# Patient Record
Sex: MALE | Race: BLACK OR AFRICAN AMERICAN | NOT HISPANIC OR LATINO | Employment: OTHER | URBAN - METROPOLITAN AREA
[De-identification: names, ages, dates, MRNs, and addresses within clinical notes are randomized per-mention and may not be internally consistent; named-entity substitution may affect disease eponyms.]

---

## 2024-06-21 ENCOUNTER — HOSPITAL ENCOUNTER (EMERGENCY)
Facility: HOSPITAL | Age: 39
Discharge: HOME | End: 2024-06-21
Attending: EMERGENCY MEDICINE

## 2024-06-21 VITALS
HEIGHT: 71 IN | BODY MASS INDEX: 24.78 KG/M2 | SYSTOLIC BLOOD PRESSURE: 132 MMHG | WEIGHT: 177 LBS | RESPIRATION RATE: 18 BRPM | OXYGEN SATURATION: 99 % | TEMPERATURE: 98 F | HEART RATE: 56 BPM | DIASTOLIC BLOOD PRESSURE: 79 MMHG

## 2024-06-21 DIAGNOSIS — D17.23 LIPOMA OF RIGHT LOWER EXTREMITY: ICD-10-CM

## 2024-06-21 DIAGNOSIS — L72.3 SEBACEOUS CYST: Primary | ICD-10-CM

## 2024-06-21 PROCEDURE — 99281 EMR DPT VST MAYX REQ PHY/QHP: CPT

## 2024-06-21 NOTE — DISCHARGE INSTRUCTIONS
Follow up with the [facility] as discussed.  Present to nearest ED immediately for redness to affected areas, tenderness, or wound development.

## 2024-06-21 NOTE — ED PROVIDER NOTES
"Encounter Date: 6/21/2024       History     Chief Complaint   Patient presents with    Cyst     Pt w/ cyst to right hip x3 years, sent to ER for referral. No redness, pain, S/S infection noted. No PMH. VSS, NAD noted.     Pt is a 38 y.o. male who presents to the [facility] ED for evaluation of a cyst to his Rt hip. Reports area has been present for the last 9-10 years. Denies pain to site, redness, or recent enlargement. Pt reports coming to the ED to "get a referral to remove it. I just don't want it there any more." Denies injury to site, chest pain, SOB, weakness, dizziness, fever, abdominal pain, or loss of bowel or bladder control. Reports having previous "cysts" to scalp removed in the past and admits to having one on his scalp that he also wants removed.      Review of patient's allergies indicates:   Allergen Reactions    Pcn [penicillins]      History reviewed. No pertinent past medical history.  No past surgical history on file.  No family history on file.     Review of Systems   Constitutional:  Negative for chills, diaphoresis, fatigue and fever.   HENT:  Negative for facial swelling, postnasal drip, rhinorrhea, sinus pressure, sinus pain, sore throat and trouble swallowing.    Respiratory:  Negative for cough, chest tightness, shortness of breath and wheezing.    Cardiovascular:  Negative for chest pain, palpitations and leg swelling.   Gastrointestinal:  Negative for abdominal pain, diarrhea, nausea and vomiting.   Genitourinary:  Negative for dysuria, flank pain, hematuria and urgency.   Musculoskeletal:  Negative for arthralgias, back pain and myalgias.   Skin:  Negative for color change and rash.        "Cyst" to Rt hip   Neurological:  Negative for dizziness, syncope, weakness and headaches.   Hematological:  Does not bruise/bleed easily.   All other systems reviewed and are negative.      Physical Exam     Initial Vitals [06/21/24 1522]   BP Pulse Resp Temp SpO2   132/79 (!) 56 18 97.9 °F (36.6 °C) 99 % "      MAP       --         Physical Exam    Nursing note and vitals reviewed.  Constitutional: Vital signs are normal. He appears well-developed and well-nourished.   HENT:   Head: Normocephalic.       Nose: Nose normal.   Mouth/Throat: Oropharynx is clear and moist.   Eyes: Conjunctivae and EOM are normal. Pupils are equal, round, and reactive to light.   Neck: Neck supple.   Normal range of motion.  Cardiovascular:  Normal rate, regular rhythm, normal heart sounds and intact distal pulses.           Pulmonary/Chest: Effort normal and breath sounds normal. No respiratory distress. He has no wheezes. He has no rhonchi. He has no rales. He exhibits no tenderness.   Abdominal: Abdomen is soft and flat. Bowel sounds are normal. There is no abdominal tenderness. There is no rebound, no guarding, no tenderness at McBurney's point and negative Daugherty's sign.   Musculoskeletal:         General: Normal range of motion.      Cervical back: Normal range of motion and neck supple.        Legs:      Neurological: He is alert and oriented to person, place, and time. He has normal strength.   Skin: Skin is warm and dry. Capillary refill takes less than 2 seconds.   Psychiatric: He has a normal mood and affect. His behavior is normal. Judgment and thought content normal.         ED Course   Procedures  Labs Reviewed - No data to display       Imaging Results    None          Medications - No data to display  Medical Decision Making  Differential:  Sebaceous cyst  Lipoma                 ED Course as of 06/21/24 1537   Fri Jun 21, 2024   1534 Pt will be referred to the [facility] as requested for excision of areas of concern. Stressed need for pt to return to the [facility] ED if affected areas develop redness, tenderness, or develop open wounds. Understanding and agreement voiced.   Given strict ED return precautions. I have spoken with the patient and/or caregivers. I have explained the patient's condition, diagnoses and  treatment plan based on the information available to me at this time. I have answered the patient's and/or caregiver's questions and addressed any concerns. The patient and/or caregivers have as good an understanding of the patient's diagnosis, condition and treatment plan as can be expected at this point. The vital signs have been stable. The patient's condition is stable and appropriate for discharge from the emergency department.      The patient will pursue further outpatient evaluation with the primary care physician or other designated or consulting physician as outlined in the discharge instructions. The patient and/or caregivers are agreeable to this plan of care and follow-up instructions have been explained in detail. The patient and/or caregivers have received these instructions in written format and have expressed an understanding of the discharge instructions. The patient and/or caregivers are aware that any significant change in condition or worsening of symptoms should prompt an immediate return to this or the closest emergency department or a call to 911.   [JA]      ED Course User Index  [JA] Wesley Guthrie Jr., FNP                           Clinical Impression:  Final diagnoses:  [L72.3] Sebaceous cyst (Primary)  [D17.23] Lipoma of right lower extremity          ED Disposition Condition    Discharge Stable          ED Prescriptions    None       Follow-up Information       Follow up With Specialties Details Why Contact Info    [facility] - Emergency Dept Emergency Medicine In 3 days As needed, If symptoms worsen [address] [facility] [address] [phone]             Wesley Guthrie Jr., FNP  06/21/24 9467

## 2024-07-23 ENCOUNTER — OFFICE VISIT (OUTPATIENT)
Dept: FAMILY MEDICINE | Facility: CLINIC | Age: 39
End: 2024-07-23
Payer: MEDICAID

## 2024-07-23 VITALS
HEART RATE: 82 BPM | WEIGHT: 187 LBS | SYSTOLIC BLOOD PRESSURE: 121 MMHG | TEMPERATURE: 99 F | HEIGHT: 71 IN | OXYGEN SATURATION: 99 % | BODY MASS INDEX: 26.18 KG/M2 | RESPIRATION RATE: 18 BRPM | DIASTOLIC BLOOD PRESSURE: 79 MMHG

## 2024-07-23 DIAGNOSIS — L72.3 SEBACEOUS CYST: ICD-10-CM

## 2024-07-23 DIAGNOSIS — L72.11 PILAR CYST OF SCALP: Primary | ICD-10-CM

## 2024-07-23 DIAGNOSIS — D17.23 LIPOMA OF RIGHT LOWER EXTREMITY: ICD-10-CM

## 2024-07-23 PROCEDURE — 99214 OFFICE O/P EST MOD 30 MIN: CPT | Mod: PBBFAC

## 2024-07-23 PROCEDURE — 11422 EXC H-F-NK-SP B9+MARG 1.1-2: CPT | Mod: PBBFAC

## 2024-07-23 RX ORDER — LIDOCAINE HYDROCHLORIDE 10 MG/ML
5 INJECTION, SOLUTION EPIDURAL; INFILTRATION; INTRACAUDAL; PERINEURAL
Status: COMPLETED | OUTPATIENT
Start: 2024-07-23 | End: 2024-07-23

## 2024-07-23 RX ADMIN — LIDOCAINE HYDROCHLORIDE 50 MG: 10 INJECTION, SOLUTION EPIDURAL; INFILTRATION; INTRACAUDAL; PERINEURAL at 01:07

## 2024-07-23 NOTE — PROGRESS NOTES
"[facility]    CHIEF COMPLAINT:  Chief Complaint   Patient presents with    cyst on leg           HISTORY OF  PRESENT ILLNESS:  Tonio Tello is a 38 y.o. male who presents to  [facility] for a cyst on the scalp and cyst on the R hip.     Cyst on scalp-present for 2 years. Makes it hard for him to shave his head. Denies pain, bleeding, drainage. Has had cysts of the scalp removed twice in the past, over 10 years ago. The cyst today is new.     Cyst on R hip- present for 15 years. Has grown bigger overtime. Denies pain, bleeding, drainage.     Patient would like to discuss treatment options today.      REVIEW OF SYSTEMS:  Per HPI.    PHYSICAL EXAMINATION:  Blood pressure 121/79, pulse 82, temperature 98.6 °F (37 °C), resp. rate 18, height 5' 10.87" (1.8 m), weight 84.8 kg (187 lb), SpO2 99%.    General:  VSS. No acute distress.   Skin: ~1.5 cm flesh-colored, nodular lesion on the R scalp parietal area. ~10 cm flesh-colored, nodular mass on the R hip. (See photos below)      R scalp cyst        R hip lipoma     ASSESSMENT/PLAN:  Pilar cyst of scalp  - Excision of R scalp cyst performed today, see procedure note below   - Post-care instructions given     Lipoma of R hip  - Will refer to general surgery as mass is too large to remove in [facility]     Return to  [facility] in 2 weeks for suture removal     PROCEDURE NOTE:  Procedure: Excision of cyst on R scalp   Performed by: Ginger Lynne MD  Supervised by: Carla Rsos MD   Consent: signed consent obtained after discussion of risks, benefits, and alternative treatments. Time out completed  Description: Skin prepped with alcohol pads. 1% lidocaine used for anesthesia. Skin cleaned with Chloroprep, draped in a sterile fashion. Incision made with 4mm punch biopsy. Some sebaceous material expressed manually. Cyst wall  from dermis using blunt dissection, extracted through wound opening. Hemostasis " achieved with pressure. Wound closure with one interrupted suture of Ethilon 4-0. Estimated Blood Loss: <5cc   The patient tolerated the procedure well with no complications.      Ginger Lynne MD    [facility] Family Medicine Resident, [facility]

## 2024-07-30 ENCOUNTER — OFFICE VISIT (OUTPATIENT)
Dept: SURGERY | Facility: CLINIC | Age: 39
End: 2024-07-30
Payer: MEDICAID

## 2024-07-30 VITALS
SYSTOLIC BLOOD PRESSURE: 126 MMHG | DIASTOLIC BLOOD PRESSURE: 79 MMHG | WEIGHT: 185 LBS | RESPIRATION RATE: 18 BRPM | HEART RATE: 56 BPM | OXYGEN SATURATION: 100 % | TEMPERATURE: 98 F | HEIGHT: 70 IN | BODY MASS INDEX: 26.48 KG/M2

## 2024-07-30 DIAGNOSIS — D17.23 LIPOMA OF RIGHT LOWER EXTREMITY: ICD-10-CM

## 2024-07-30 PROCEDURE — 99214 OFFICE O/P EST MOD 30 MIN: CPT | Mod: PBBFAC

## 2024-07-30 NOTE — PROGRESS NOTES
[facility] GENERAL SURGERY   Clinic Note       CC: R hip mass      HPI: Tonio Tello is a 38 y.o. male with no pertinent PMHx presents to the clinic for evaluation of R hip mass that has been present for approximately 10 years and has progressively increased in size over the past 5 years. Patient had a R scalp pilar cyst excised on 7/23/24 in [facility]. Denies any redness, drainage, or pain to the area. Denies any fever, chills, abdominal pain, n/v, or changes in BM. Able to exercise without chest pain. Surgical history includes hemorrhoidectomy approximately 4 years ago.     PMH:  No past medical history on file.      PSH:  No past surgical history on file.      Medications:  No current outpatient medications on file prior to visit.     No current facility-administered medications on file prior to visit.        Allergies:  Review of patient's allergies indicates:   Allergen Reactions    Pcn [penicillins]          Social Hx:  Social History     Tobacco Use   Smoking Status Every Day    Current packs/day: 1.00    Average packs/day: 1 pack/day for 5.0 years (5.0 ttl pk-yrs)    Types: Cigarettes   Smokeless Tobacco Not on file      Social History     Substance and Sexual Activity   Alcohol Use Not on file         Relevant Family Hx:  No family history on file.       Physical Exam:   There were no vitals filed for this visit.   Gen: NAD, AAOx3   Eye: EOMI   Pulm: NWOB on RA  Abd: soft, non-tender, non-distended  Ext:  Move all 4 extremities.   Skin: ~11 cm flesh colored, soft, nodular, nontender immobile mass on the R hip (photo below)                A/P: Tonio Tello is a 38 y.o. male with no pertinent PMHx presents to the clinic for evalutation of R hip mass. Mass has been present for approximately 10 years and steadily grown in size over the past 5 years. Denies any changes in the skin overlying the area, pain, or drainage. Mass is approximately 11 cm on the R hip and is soft, immobile, and  nontender. PT is able to exercise without chest pain.       - Obtain CT scan   - Follow up in clinic in 3 weeks with CT scan results. Will evaluate for possible excision at that time.     Iva Smith  [facility] MS3

## 2024-07-30 NOTE — PROGRESS NOTES
[facility] GENERAL SURGERY   Clinic Note       CC: R hip mass      HPI: Tonio Tello is a 38 y.o. male with no pertinent PMHx presents to the clinic for evaluation of right hip mass that has been present for approximately 10 years and has progressively increased in size over the past 5 years. He reports some pain and discomfort with lying on his right side. Denies any redness, drainage, or pain to the area. Patient had a R scalp pilar cyst excised on 7/23/24 in [facility]. Denies any fever, chills, abdominal pain, n/v, or changes in BM. Able to achieve >4 METs without chest pain. Surgical history includes hemorrhoidectomy approximately 4 years ago.     PMH:  History reviewed. No pertinent past medical history.      PSH:  History reviewed. No pertinent surgical history.      Medications:  No current outpatient medications on file prior to visit.     No current facility-administered medications on file prior to visit.        Allergies:  Review of patient's allergies indicates:   Allergen Reactions    Pcn [penicillins]          Social Hx:  Social History     Tobacco Use   Smoking Status Every Day    Current packs/day: 1.00    Average packs/day: 1 pack/day for 5.0 years (5.0 ttl pk-yrs)    Types: Cigarettes   Smokeless Tobacco Never      Social History     Substance and Sexual Activity   Alcohol Use None         Relevant Family Hx:  No family history on file.       Physical Exam:   Vitals:    07/30/24 1035   BP: 126/79   Pulse: (!) 56   Resp: 18   Temp: 97.9 °F (36.6 °C)      Gen: NAD, AAOx3   Eye: EOMI   Pulm: NWOB on RA  Abd: soft, non-tender, non-distended  Ext:  Move all 4 extremities.   Skin: ~11 cm flesh colored, soft, non-tender, immobile mass on the R hip (photo below)                A/P: Tonio Tello is a 38 y.o. male with no pertinent PMHx presents to the clinic for evalutation of R hip mass. Mass has been present for approximately 10 years and steadily grown in size over the past 5  years. Denies any changes in the skin overlying the area, pain, or drainage. Mass is approximately 11 cm on the R hip and is soft, immobile, and nontender. PT is able to achieve >4 METs without chest pain or SOB.       - CT w/ contrast of Right Hip  - Follow up in clinic in 3 weeks with CT scan results.   - Will evaluate for possible excision at that time.     Iva Smith  [facility] LAW MS3    Patient seen and examined alongside medical student. Note reviewed and edited as appropriate. Agree with plan as written above.     Gilberto Pepper MD  [facility] General Surgery PGY-1

## 2024-08-02 ENCOUNTER — HOSPITAL ENCOUNTER (OUTPATIENT)
Dept: RADIOLOGY | Facility: HOSPITAL | Age: 39
Discharge: HOME | End: 2024-08-02
Payer: MEDICAID

## 2024-08-02 DIAGNOSIS — D17.23 LIPOMA OF RIGHT LOWER EXTREMITY: ICD-10-CM

## 2024-08-02 PROCEDURE — 73701 CT LOWER EXTREMITY W/DYE: CPT | Mod: TC,RT

## 2024-08-02 PROCEDURE — 25500020 PHARM REV CODE 255

## 2024-08-02 RX ADMIN — IOHEXOL 100 ML: 350 INJECTION, SOLUTION INTRAVENOUS at 01:08

## 2024-08-06 ENCOUNTER — OFFICE VISIT (OUTPATIENT)
Dept: FAMILY MEDICINE | Facility: CLINIC | Age: 39
End: 2024-08-06
Payer: MEDICAID

## 2024-08-06 VITALS
WEIGHT: 189 LBS | BODY MASS INDEX: 27.06 KG/M2 | TEMPERATURE: 99 F | RESPIRATION RATE: 18 BRPM | SYSTOLIC BLOOD PRESSURE: 128 MMHG | OXYGEN SATURATION: 99 % | HEIGHT: 70 IN | DIASTOLIC BLOOD PRESSURE: 79 MMHG

## 2024-08-06 DIAGNOSIS — Z48.02 VISIT FOR SUTURE REMOVAL: Primary | ICD-10-CM

## 2024-08-06 PROCEDURE — 99213 OFFICE O/P EST LOW 20 MIN: CPT | Mod: PBBFAC

## 2024-08-15 ENCOUNTER — ANESTHESIA EVENT (OUTPATIENT)
Dept: SURGERY | Facility: HOSPITAL | Age: 39
End: 2024-08-15
Payer: MEDICAID

## 2024-08-15 ENCOUNTER — OFFICE VISIT (OUTPATIENT)
Dept: SURGERY | Facility: CLINIC | Age: 39
End: 2024-08-15
Payer: MEDICAID

## 2024-08-15 VITALS
WEIGHT: 187.19 LBS | OXYGEN SATURATION: 100 % | SYSTOLIC BLOOD PRESSURE: 123 MMHG | BODY MASS INDEX: 26.8 KG/M2 | DIASTOLIC BLOOD PRESSURE: 84 MMHG | HEIGHT: 70 IN | TEMPERATURE: 98 F | HEART RATE: 55 BPM

## 2024-08-15 DIAGNOSIS — D17.23 LIPOMA OF RIGHT LOWER EXTREMITY: Primary | ICD-10-CM

## 2024-08-15 PROCEDURE — 99214 OFFICE O/P EST MOD 30 MIN: CPT | Mod: PBBFAC

## 2024-08-15 RX ORDER — HEPARIN SODIUM 5000 [USP'U]/ML
5000 INJECTION, SOLUTION INTRAVENOUS; SUBCUTANEOUS EVERY 8 HOURS
OUTPATIENT
Start: 2024-08-15

## 2024-08-15 RX ORDER — SODIUM CHLORIDE 9 MG/ML
INJECTION, SOLUTION INTRAVENOUS CONTINUOUS
OUTPATIENT
Start: 2024-08-15

## 2024-08-15 RX ORDER — CLINDAMYCIN PHOSPHATE 900 MG/50ML
900 INJECTION, SOLUTION INTRAVENOUS
OUTPATIENT
Start: 2024-08-15

## 2024-08-15 NOTE — PROGRESS NOTES
Seen by Dr. Pepper.  Surgery scheduled 8/21/24.  Surgery consent signed and witnessed.  One Day Stay instruction sheet explained and given to patient.  Post op appt scheduled.

## 2024-08-15 NOTE — PROGRESS NOTES
[facility] GENERAL SURGERY   Clinic Note       CC: Right Hip Lipoma       HPI: Tonio Tello is a 38 y.o. male with no pertinent PMHx presents to the clinic for evaluation of right hip mass that has been present for approximately 10 years and has progressively increased in size over the past 5 years. Patient had CT scan on 08/02 which showed a 9.2 x 3.8 x 10.5 cm encapsulated appearing area of fat attenuation along the lateral aspect of the right hip. He reports some pain and discomfort with lying on his right side. Denies any redness, drainage, or pain to the area. Denies any fever, chills, abdominal pain, n/v, or changes in BM. Able to achieve >4 METs without chest pain. Surgical history includes hemorrhoidectomy approximately 4 years ago.         PMH:  History reviewed. No pertinent past medical history.      PSH:  Past Surgical History:   Procedure Laterality Date    HEMORRHOID SURGERY      x 2 surgeries 3 years ago         Medications:  No current outpatient medications on file prior to visit.     No current facility-administered medications on file prior to visit.        Allergies:  Review of patient's allergies indicates:   Allergen Reactions    Pcn [penicillins]          Social Hx:  Social History     Tobacco Use   Smoking Status Every Day    Current packs/day: 1.00    Average packs/day: 1 pack/day for 5.0 years (5.0 ttl pk-yrs)    Types: Cigarettes   Smokeless Tobacco Never   Tobacco Comments    Smokes 1 pack per week      Social History     Substance and Sexual Activity   Alcohol Use Yes    Comment: 6 times yearly--1 beer         Relevant Family Hx:  Family History   Problem Relation Name Age of Onset    Cancer Mother      Heart disease Father            Physical Exam:   Vitals:    08/15/24 1142   BP: 123/84   Pulse: (!) 55   Temp: 98.1 °F (36.7 °C)      Gen: NAD, AAOx3   Eye: EOMI   CV: RR  Pulm: NWOB on RA  Abd: soft, non-tender, non-distended  Ext:  Move all 4 extremities. Large soft, mobile subcutaneous  mass on right hip        Interval Imaging:    CT Right Hip 08/02:    FINDINGS:  There appears to be an encapsulated appearing area of fat attenuation along the lateral aspect of the right hip overlying the right gluteus medius muscle.  This measures approximately 9.2 x 3.8 x 10.5 cm (AP by transverse by cc).  No definite suspicious enhancing soft tissue nodules are appreciated.  However, there appears to be evidence of slightly thickened appearing soft tissue septations along the inferior margin of this lipomatous lesion and subtle thickening of the posterior-inferior capsule.  Although this finding could be related to a lipoma, low-grade liposarcoma would be difficult to entirely exclude given slightly thickened septations and posteroinferior capsule.  No suspicious enhancement within the adjacent gluteus medius muscle is seen.  Subcentimeter in short axis dimension right pelvic lymph nodes are noted.  The included pelvic bowel loops demonstrate no significant dilatation to suggest high-grade obstruction.  The appendix is visualized and appears normal in caliber.  Small fat containing right inguinal hernia is noted.  No evidence of acute displaced fracture or active dislocation is visualized.  The superior right hip joint space is grossly maintained.  The visualized sacroiliac joints appear patent and aligned.     Impression:  There is an encapsulated appearing area of fat attenuation along the lateral aspect of the right hip.No definite suspicious enhancing soft tissue nodules are appreciated. However, there appears to be evidence of slightly thickened appearing soft tissue septations along the inferior margin of this lipomatous lesion and subtle thickening of the posterior-inferior capsule.  Although this finding could be related to a lipoma, low-grade liposarcoma would be difficult to entirely exclude given slightly thickened septations and posteroinferior capsule.  Please correlate clinically in regards to  further evaluation and follow-up.             A/P: Tonio Tello is a 38 y.o. male with a  9.2 x 3.8 x 10.5 cm lipomatous mass of the right hip presenting today to discuss surgical intervention.      - OR for lipomatous mass removal on 8/21  - r/b/a discussed with patient, who agrees and would like to proceed with surgical intervention.  - consents obtained  - NPO midnight day of surgery  - Follow up 2 weeks post-op    Gilberto Pepper MD  [facility] General Surgery PGY-1

## 2024-08-15 NOTE — H&P (VIEW-ONLY)
[facility] GENERAL SURGERY   Clinic Note       CC: Right Hip Lipoma       HPI: Tonio Tello is a 38 y.o. male with no pertinent PMHx presents to the clinic for evaluation of right hip mass that has been present for approximately 10 years and has progressively increased in size over the past 5 years. Patient had CT scan on 08/02 which showed a 9.2 x 3.8 x 10.5 cm encapsulated appearing area of fat attenuation along the lateral aspect of the right hip. He reports some pain and discomfort with lying on his right side. Denies any redness, drainage, or pain to the area. Denies any fever, chills, abdominal pain, n/v, or changes in BM. Able to achieve >4 METs without chest pain. Surgical history includes hemorrhoidectomy approximately 4 years ago.         PMH:  History reviewed. No pertinent past medical history.      PSH:  Past Surgical History:   Procedure Laterality Date    HEMORRHOID SURGERY      x 2 surgeries 3 years ago         Medications:  No current outpatient medications on file prior to visit.     No current facility-administered medications on file prior to visit.        Allergies:  Review of patient's allergies indicates:   Allergen Reactions    Pcn [penicillins]          Social Hx:  Social History     Tobacco Use   Smoking Status Every Day    Current packs/day: 1.00    Average packs/day: 1 pack/day for 5.0 years (5.0 ttl pk-yrs)    Types: Cigarettes   Smokeless Tobacco Never   Tobacco Comments    Smokes 1 pack per week      Social History     Substance and Sexual Activity   Alcohol Use Yes    Comment: 6 times yearly--1 beer         Relevant Family Hx:  Family History   Problem Relation Name Age of Onset    Cancer Mother      Heart disease Father            Physical Exam:   Vitals:    08/15/24 1142   BP: 123/84   Pulse: (!) 55   Temp: 98.1 °F (36.7 °C)      Gen: NAD, AAOx3   Eye: EOMI   CV: RR  Pulm: NWOB on RA  Abd: soft, non-tender, non-distended  Ext:  Move all 4 extremities. Large soft, mobile subcutaneous  mass on right hip        Interval Imaging:    CT Right Hip 08/02:    FINDINGS:  There appears to be an encapsulated appearing area of fat attenuation along the lateral aspect of the right hip overlying the right gluteus medius muscle.  This measures approximately 9.2 x 3.8 x 10.5 cm (AP by transverse by cc).  No definite suspicious enhancing soft tissue nodules are appreciated.  However, there appears to be evidence of slightly thickened appearing soft tissue septations along the inferior margin of this lipomatous lesion and subtle thickening of the posterior-inferior capsule.  Although this finding could be related to a lipoma, low-grade liposarcoma would be difficult to entirely exclude given slightly thickened septations and posteroinferior capsule.  No suspicious enhancement within the adjacent gluteus medius muscle is seen.  Subcentimeter in short axis dimension right pelvic lymph nodes are noted.  The included pelvic bowel loops demonstrate no significant dilatation to suggest high-grade obstruction.  The appendix is visualized and appears normal in caliber.  Small fat containing right inguinal hernia is noted.  No evidence of acute displaced fracture or active dislocation is visualized.  The superior right hip joint space is grossly maintained.  The visualized sacroiliac joints appear patent and aligned.     Impression:  There is an encapsulated appearing area of fat attenuation along the lateral aspect of the right hip.No definite suspicious enhancing soft tissue nodules are appreciated. However, there appears to be evidence of slightly thickened appearing soft tissue septations along the inferior margin of this lipomatous lesion and subtle thickening of the posterior-inferior capsule.  Although this finding could be related to a lipoma, low-grade liposarcoma would be difficult to entirely exclude given slightly thickened septations and posteroinferior capsule.  Please correlate clinically in regards to  further evaluation and follow-up.             A/P: Tonio Tello is a 38 y.o. male with a  9.2 x 3.8 x 10.5 cm lipomatous mass of the right hip presenting today to discuss surgical intervention.      - OR for lipomatous mass removal on 8/21  - r/b/a discussed with patient, who agrees and would like to proceed with surgical intervention.  - consents obtained  - NPO midnight day of surgery  - Follow up 2 weeks post-op    Gilberto ePpper MD  [facility] General Surgery PGY-1

## 2024-08-15 NOTE — ANESTHESIA PREPROCEDURE EVALUATION
Tonio Tello is a 38 y.o. male PRESENTING FOR EXCISION, LIPOMA (Right) with a history of   -LIPOMA RLE  -SMOKER      BETA-BLOCKER: NONE    New Orders for Anesthesia: NONE    Active Ambulatory Problems     Diagnosis Date Noted    No Active Ambulatory Problems     Resolved Ambulatory Problems     Diagnosis Date Noted    No Resolved Ambulatory Problems     No Additional Past Medical History         Pre-op Assessment    I have reviewed the NPO Status.      Review of Systems  Anesthesia Hx:  No problems with previous Anesthesia                Social:  Smoker       Cardiovascular:  Cardiovascular Normal                                            Pulmonary:  Pulmonary Normal                       Renal/:  Renal/ Normal                 Hepatic/GI:  Hepatic/GI Normal                 Neurological:  Neurology Normal                                      Endocrine:  Endocrine Normal              Vitals:    08/21/24 0624 08/21/24 0629 08/21/24 0655 08/21/24 0808   BP: 128/82  128/82 132/78   BP Location: Right arm      Patient Position: Sitting      Pulse: (!) 41 (!) 44  (!) 48   Resp: 18   20   Temp: 36.3 °C (97.4 °F)   36 °C (96.8 °F)   TempSrc: Oral   Temporal   SpO2: 100%   100%   Weight:             Physical Exam  General: Alert, Cooperative and Well nourished    Airway:  Mallampati: II   Mouth Opening: Normal  TM Distance: Normal  Tongue: Normal  Neck ROM: Normal ROM    Dental:  Partial Dentures    Chest/Lungs:  Clear to auscultation, Normal Respiratory Rate    Heart:  Rate: Normal  Rhythm: Regular Rhythm  Sounds: Normal        Anesthesia Plan  Type of Anesthesia, risks & benefits discussed:    Anesthesia Type: Gen Supraglottic Airway  Intra-op Monitoring Plan: Standard ASA Monitors  Post Op Pain Control Plan: IV/PO Opioids PRN  Induction:  IV  Airway Plan: Direct  Informed Consent: Informed consent signed with the Patient and all parties understand the risks and agree with anesthesia plan.  All questions answered.    ASA Score: 2  Day of Surgery Review of History & Physical: H&P Update referred to the surgeon/provider.    Ready For Surgery From Anesthesia Perspective.     .

## 2024-08-16 NOTE — PROGRESS NOTES
I have reviewed the notes, assessments, and/or procedures performed by Shantelle, I concur with her/his documentation of Tonio Tello.  Date of Service: 8/15/2024    [facility]

## 2024-08-20 ENCOUNTER — PATIENT MESSAGE (OUTPATIENT)
Dept: SURGERY | Facility: HOSPITAL | Age: 39
End: 2024-08-20
Payer: MEDICAID

## 2024-08-21 ENCOUNTER — ANESTHESIA (OUTPATIENT)
Dept: SURGERY | Facility: HOSPITAL | Age: 39
End: 2024-08-21
Payer: MEDICAID

## 2024-08-21 ENCOUNTER — HOSPITAL ENCOUNTER (OUTPATIENT)
Facility: HOSPITAL | Age: 39
Discharge: HOME | End: 2024-08-21
Attending: STUDENT IN AN ORGANIZED HEALTH CARE EDUCATION/TRAINING PROGRAM | Admitting: STUDENT IN AN ORGANIZED HEALTH CARE EDUCATION/TRAINING PROGRAM
Payer: MEDICAID

## 2024-08-21 DIAGNOSIS — D17.23 LIPOMA OF RIGHT LOWER EXTREMITY: ICD-10-CM

## 2024-08-21 PROCEDURE — 37000008 HC ANESTHESIA 1ST 15 MINUTES: Performed by: STUDENT IN AN ORGANIZED HEALTH CARE EDUCATION/TRAINING PROGRAM

## 2024-08-21 PROCEDURE — 63600175 PHARM REV CODE 636 W HCPCS: Performed by: ANESTHESIOLOGY

## 2024-08-21 PROCEDURE — 25000003 PHARM REV CODE 250: Performed by: NURSE ANESTHETIST, CERTIFIED REGISTERED

## 2024-08-21 PROCEDURE — 71000015 HC POSTOP RECOV 1ST HR: Performed by: STUDENT IN AN ORGANIZED HEALTH CARE EDUCATION/TRAINING PROGRAM

## 2024-08-21 PROCEDURE — 63600175 PHARM REV CODE 636 W HCPCS: Performed by: STUDENT IN AN ORGANIZED HEALTH CARE EDUCATION/TRAINING PROGRAM

## 2024-08-21 PROCEDURE — 63600175 PHARM REV CODE 636 W HCPCS: Performed by: NURSE ANESTHETIST, CERTIFIED REGISTERED

## 2024-08-21 PROCEDURE — 71000033 HC RECOVERY, INTIAL HOUR: Performed by: STUDENT IN AN ORGANIZED HEALTH CARE EDUCATION/TRAINING PROGRAM

## 2024-08-21 PROCEDURE — 71000016 HC POSTOP RECOV ADDL HR: Performed by: STUDENT IN AN ORGANIZED HEALTH CARE EDUCATION/TRAINING PROGRAM

## 2024-08-21 PROCEDURE — 37000009 HC ANESTHESIA EA ADD 15 MINS: Performed by: STUDENT IN AN ORGANIZED HEALTH CARE EDUCATION/TRAINING PROGRAM

## 2024-08-21 PROCEDURE — 63600175 PHARM REV CODE 636 W HCPCS: Mod: JZ,JG | Performed by: STUDENT IN AN ORGANIZED HEALTH CARE EDUCATION/TRAINING PROGRAM

## 2024-08-21 PROCEDURE — 36000706: Performed by: STUDENT IN AN ORGANIZED HEALTH CARE EDUCATION/TRAINING PROGRAM

## 2024-08-21 PROCEDURE — 36000707: Performed by: STUDENT IN AN ORGANIZED HEALTH CARE EDUCATION/TRAINING PROGRAM

## 2024-08-21 PROCEDURE — 25000003 PHARM REV CODE 250: Performed by: STUDENT IN AN ORGANIZED HEALTH CARE EDUCATION/TRAINING PROGRAM

## 2024-08-21 PROCEDURE — 88304 TISSUE EXAM BY PATHOLOGIST: CPT | Mod: TC | Performed by: STUDENT IN AN ORGANIZED HEALTH CARE EDUCATION/TRAINING PROGRAM

## 2024-08-21 RX ORDER — OXYCODONE HYDROCHLORIDE 5 MG/1
5 TABLET ORAL
Status: DISCONTINUED | OUTPATIENT
Start: 2024-08-21 | End: 2024-08-21 | Stop reason: HOSPADM

## 2024-08-21 RX ORDER — HYDROCODONE BITARTRATE AND ACETAMINOPHEN 5; 325 MG/1; MG/1
1 TABLET ORAL EVERY 8 HOURS PRN
Qty: 5 TABLET | Refills: 0 | Status: SHIPPED | OUTPATIENT
Start: 2024-08-21

## 2024-08-21 RX ORDER — GLUCAGON 1 MG
1 KIT INJECTION
Status: DISCONTINUED | OUTPATIENT
Start: 2024-08-21 | End: 2024-08-21 | Stop reason: HOSPADM

## 2024-08-21 RX ORDER — MEPERIDINE HYDROCHLORIDE 25 MG/ML
12.5 INJECTION INTRAMUSCULAR; INTRAVENOUS; SUBCUTANEOUS EVERY 10 MIN PRN
Status: DISCONTINUED | OUTPATIENT
Start: 2024-08-21 | End: 2024-08-21 | Stop reason: HOSPADM

## 2024-08-21 RX ORDER — CLINDAMYCIN PHOSPHATE 900 MG/50ML
900 INJECTION, SOLUTION INTRAVENOUS
Status: COMPLETED | OUTPATIENT
Start: 2024-08-21 | End: 2024-08-21

## 2024-08-21 RX ORDER — HEPARIN SODIUM 5000 [USP'U]/ML
5000 INJECTION, SOLUTION INTRAVENOUS; SUBCUTANEOUS EVERY 8 HOURS
Status: DISCONTINUED | OUTPATIENT
Start: 2024-08-21 | End: 2024-08-21 | Stop reason: HOSPADM

## 2024-08-21 RX ORDER — LIDOCAINE HYDROCHLORIDE 20 MG/ML
INJECTION INTRAVENOUS
Status: DISCONTINUED | OUTPATIENT
Start: 2024-08-21 | End: 2024-08-21

## 2024-08-21 RX ORDER — PROPOFOL 10 MG/ML
VIAL (ML) INTRAVENOUS
Status: DISCONTINUED | OUTPATIENT
Start: 2024-08-21 | End: 2024-08-21

## 2024-08-21 RX ORDER — MORPHINE SULFATE 2 MG/ML
2 INJECTION, SOLUTION INTRAMUSCULAR; INTRAVENOUS EVERY 5 MIN PRN
Status: DISCONTINUED | OUTPATIENT
Start: 2024-08-21 | End: 2024-08-21 | Stop reason: HOSPADM

## 2024-08-21 RX ORDER — DEXAMETHASONE SODIUM PHOSPHATE 4 MG/ML
INJECTION, SOLUTION INTRA-ARTICULAR; INTRALESIONAL; INTRAMUSCULAR; INTRAVENOUS; SOFT TISSUE
Status: DISCONTINUED | OUTPATIENT
Start: 2024-08-21 | End: 2024-08-21

## 2024-08-21 RX ORDER — FENTANYL CITRATE 50 UG/ML
INJECTION, SOLUTION INTRAMUSCULAR; INTRAVENOUS
Status: DISCONTINUED | OUTPATIENT
Start: 2024-08-21 | End: 2024-08-21

## 2024-08-21 RX ORDER — BUPIVACAINE HYDROCHLORIDE AND EPINEPHRINE 5; 5 MG/ML; UG/ML
INJECTION, SOLUTION EPIDURAL; INTRACAUDAL; PERINEURAL
Status: DISCONTINUED | OUTPATIENT
Start: 2024-08-21 | End: 2024-08-21 | Stop reason: HOSPADM

## 2024-08-21 RX ORDER — ONDANSETRON HYDROCHLORIDE 2 MG/ML
4 INJECTION, SOLUTION INTRAVENOUS DAILY PRN
Status: DISCONTINUED | OUTPATIENT
Start: 2024-08-21 | End: 2024-08-21 | Stop reason: HOSPADM

## 2024-08-21 RX ORDER — ROCURONIUM BROMIDE 10 MG/ML
INJECTION, SOLUTION INTRAVENOUS
Status: DISCONTINUED | OUTPATIENT
Start: 2024-08-21 | End: 2024-08-21

## 2024-08-21 RX ORDER — SODIUM CHLORIDE, SODIUM LACTATE, POTASSIUM CHLORIDE, CALCIUM CHLORIDE 600; 310; 30; 20 MG/100ML; MG/100ML; MG/100ML; MG/100ML
INJECTION, SOLUTION INTRAVENOUS CONTINUOUS
Status: DISCONTINUED | OUTPATIENT
Start: 2024-08-21 | End: 2024-08-21 | Stop reason: HOSPADM

## 2024-08-21 RX ORDER — ONDANSETRON HYDROCHLORIDE 2 MG/ML
INJECTION, SOLUTION INTRAVENOUS
Status: DISCONTINUED | OUTPATIENT
Start: 2024-08-21 | End: 2024-08-21

## 2024-08-21 RX ORDER — SODIUM CHLORIDE 0.9 % (FLUSH) 0.9 %
10 SYRINGE (ML) INJECTION
Status: DISCONTINUED | OUTPATIENT
Start: 2024-08-21 | End: 2024-08-21 | Stop reason: HOSPADM

## 2024-08-21 RX ORDER — MIDAZOLAM HYDROCHLORIDE 2 MG/2ML
2 INJECTION, SOLUTION INTRAMUSCULAR; INTRAVENOUS ONCE AS NEEDED
Status: COMPLETED | OUTPATIENT
Start: 2024-08-21 | End: 2024-08-21

## 2024-08-21 RX ORDER — KETOROLAC TROMETHAMINE 30 MG/ML
INJECTION, SOLUTION INTRAMUSCULAR; INTRAVENOUS
Status: DISCONTINUED | OUTPATIENT
Start: 2024-08-21 | End: 2024-08-21

## 2024-08-21 RX ORDER — DEXMEDETOMIDINE HYDROCHLORIDE 100 UG/ML
INJECTION, SOLUTION INTRAVENOUS
Status: DISCONTINUED | OUTPATIENT
Start: 2024-08-21 | End: 2024-08-21

## 2024-08-21 RX ORDER — SODIUM CHLORIDE 9 MG/ML
INJECTION, SOLUTION INTRAVENOUS CONTINUOUS
Status: DISCONTINUED | OUTPATIENT
Start: 2024-08-21 | End: 2024-08-21 | Stop reason: HOSPADM

## 2024-08-21 RX ADMIN — CLINDAMYCIN PHOSPHATE 900 MG: 900 INJECTION, SOLUTION INTRAVENOUS at 09:08

## 2024-08-21 RX ADMIN — DEXAMETHASONE SODIUM PHOSPHATE 8 MG: 4 INJECTION, SOLUTION INTRA-ARTICULAR; INTRALESIONAL; INTRAMUSCULAR; INTRAVENOUS; SOFT TISSUE at 09:08

## 2024-08-21 RX ADMIN — SUGAMMADEX 200 MG: 100 INJECTION, SOLUTION INTRAVENOUS at 10:08

## 2024-08-21 RX ADMIN — SODIUM CHLORIDE, POTASSIUM CHLORIDE, SODIUM LACTATE AND CALCIUM CHLORIDE: 600; 310; 30; 20 INJECTION, SOLUTION INTRAVENOUS at 08:08

## 2024-08-21 RX ADMIN — PROPOFOL 200 MG: 10 INJECTION, EMULSION INTRAVENOUS at 08:08

## 2024-08-21 RX ADMIN — ONDANSETRON 4 MG: 2 INJECTION INTRAMUSCULAR; INTRAVENOUS at 09:08

## 2024-08-21 RX ADMIN — FENTANYL CITRATE 100 MCG: 50 INJECTION INTRAMUSCULAR; INTRAVENOUS at 08:08

## 2024-08-21 RX ADMIN — ROCURONIUM BROMIDE 50 MG: 10 INJECTION INTRAVENOUS at 08:08

## 2024-08-21 RX ADMIN — DEXMEDETOMIDINE 20 MCG: 200 INJECTION, SOLUTION INTRAVENOUS at 09:08

## 2024-08-21 RX ADMIN — LIDOCAINE HYDROCHLORIDE 100 MG: 20 INJECTION INTRAVENOUS at 08:08

## 2024-08-21 RX ADMIN — KETOROLAC TROMETHAMINE 30 MG: 30 INJECTION, SOLUTION INTRAMUSCULAR at 09:08

## 2024-08-21 RX ADMIN — HEPARIN SODIUM 5000 UNITS: 5000 INJECTION, SOLUTION INTRAVENOUS; SUBCUTANEOUS at 06:08

## 2024-08-21 RX ADMIN — MIDAZOLAM HYDROCHLORIDE 2 MG: 1 INJECTION, SOLUTION INTRAMUSCULAR; INTRAVENOUS at 08:08

## 2024-08-21 NOTE — ANESTHESIA POSTPROCEDURE EVALUATION
Anesthesia Post Evaluation    Patient: Tonio Tello    Procedure(s) Performed: Procedure(s) (LRB):  EXCISION, LIPOMA (Right)    Final Anesthesia Type: general      Patient location during evaluation: DOSC  Post-procedure vital signs: reviewed and stable  Airway patency: patent      Anesthetic complications: no      Cardiovascular status: hemodynamically stable  Respiratory status: spontaneous ventilation  Follow-up not needed.              Vitals Value Taken Time   /84 08/21/24 1146   Temp 36.4 °C (97.5 °F) 08/21/24 1100   Pulse 60 08/21/24 1200   Resp 18 08/21/24 1145   SpO2 100 % 08/21/24 1154   Vitals shown include unfiled device data.      Event Time   Out of Recovery 10:55:00         Pain/David Score: David Score: 8 (8/21/2024 10:49 AM)  Modified David Score: 20 (8/21/2024 12:00 PM)

## 2024-08-21 NOTE — DISCHARGE SUMMARY
[facility] - Periop Services  Discharge Note  Short Stay    Procedure(s) (LRB):  EXCISION, LIPOMA (Right)      OUTCOME: Patient tolerated treatment/procedure well without complication and is now ready for discharge.    DISPOSITION: Home or Self Care    FINAL DIAGNOSIS:  lipomatous subcutaneous mass of the right hip    FOLLOWUP: In clinic    DISCHARGE INSTRUCTIONS:    Discharge Procedure Orders   Diet Adult Regular     Notify your health care provider if you experience any of the following:  temperature >100.4     Notify your health care provider if you experience any of the following:  severe uncontrolled pain     Notify your health care provider if you experience any of the following:  redness, tenderness, or signs of infection (pain, swelling, redness, odor or green/yellow discharge around incision site)     No dressing needed   Order Comments: No swimming or bathing for 2 weeks. Can shower     Activity as tolerated        Gilberto Pepper MD  [employer] General Surgery PGY-1

## 2024-08-21 NOTE — TRANSFER OF CARE
Anesthesia Transfer of Care Note    Patient: Tonio Tello    Procedure(s) Performed: Procedure(s) (LRB):  EXCISION, LIPOMA (Right)    Patient location: PACU    Anesthesia Type: general    Transport from OR: Transported from OR on room air with adequate spontaneous ventilation    Post pain: adequate analgesia    Post assessment: no apparent anesthetic complications and tolerated procedure well    Post vital signs: stable    Level of consciousness: sedated    Nausea/Vomiting: no nausea/vomiting    Complications: none    Transfer of care protocol was followed      Last vitals: Visit Vitals  /78   Pulse (!) 48   Temp 36 °C (96.8 °F) (Temporal)   Resp 20   Wt 83.5 kg (184 lb)   SpO2 100%   BMI 26.64 kg/m²

## 2024-08-21 NOTE — ANESTHESIA PROCEDURE NOTES
Intubation    Date/Time: 8/21/2024 9:02 AM    Performed by: Nitza Antunez CRNA  Authorized by: Glendy Burton MD    Intubation:     Induction:  Intravenous    Intubated:  Postinduction    Mask Ventilation:  Easy mask    Attempts:  1    Attempted By:  Student (Dede LEIVA)    Method of Intubation:  Direct    Blade:  Nova 2    Laryngeal View Grade: Grade I - full view of cords      Difficult Airway Encountered?: No      Complications:  None    Airway Device:  Oral endotracheal tube    Airway Device Size:  7.5    Style/Cuff Inflation:  Cuffed (inflated to minimal occlusive pressure)    Inflation Amount (mL):  5    Tube secured:  24    Secured at:  The lips    Placement Verified By:  Capnometry    Complicating Factors:  None    Findings Post-Intubation:  BS equal bilateral and atraumatic/condition of teeth unchanged

## 2024-08-21 NOTE — INTERVAL H&P NOTE
The patient has been examined and the H&P has been reviewed:    I concur with the findings and no changes have occurred since H&P was written.    Surgery risks, benefits and alternative options discussed and understood by patient/family.    H&P Update    Patient seen and examined this morning. There are no changes to the documented H&P.    Patient has been NPO since midnight.     Patient has held home blood thinners for appropriate amount of time: N/A    Positioning: Right Lateral Decubitus    Pre-operative Heparin Ordered: Yes    Pre-operative Antibiotics Ordered: Yes: Clindamycin    Laterality Marked: Yes, right hip    All questions and concerns addressed. Patient confirms the procedure to be performed: EXCISION, LIPOMA.     Gilberto Pepper MD  [facility] General Surgery, PGY-1  08/21/2024 6:21 AM        There are no hospital problems to display for this patient.

## 2024-08-21 NOTE — OP NOTE
[facility] - [facility]iop Services  Surgery Department  Operative Note    SUMMARY     Date of Procedure: 8/21/2024     Procedure: Procedure(s) (LRB):  EXCISION, LIPOMA (Right)     Surgeons and Role:     * Elliot Spann MD - Primary     * Chandler Gonzlaez MD - Resident - Assisting     * Gilberto Pepper MD - Resident - Assisting      Pre-Operative Diagnosis: lipomatous subcutaneous mass of the right hip    Post-Operative Diagnosis: lipomatous subcutaneous mass of the right hip    Anesthesia: General    Operative Findings (including complications, if any): 10cm x 13cm lipomatous mass from right hip    Description of Technical Procedures: Patient evaluated, consented prior to surgery.      The patient was intubated without complications. Patient was positioned in left lateral decubitus, prepped, and draped in the standard fashion exposing the right hip. Timeout was completed. 0.5% marcaine with epi was used for local anesthesia and a 8 cm incision was made a 15 blade overlying soft tissue mass. Using cautery, blunt dissection with Tony, and manual dissection, a 10cm x 13cm soft tissue mass was freed from the capsule and excised. The cavity was irrigated with normal saline. Good hemostasis achieved prior to closure.      The fascia was closed with 6 interrupted 3-0 vicryl sutures. The dermis was then closed with 4 interrupted 3-0 vicryl sutures. A running 4-0 monocryl was then used to close the epidermis. Before  the completion of skin closure, further local anesthesia administered. Dermabond applied therafter. Patient tolerated the procedure well without complications. Patient was extubated and taken to the postoperative care area.      Dr. Spann was present for the entirety of the procedure    Estimated Blood Loss (EBL): minimal           Implants: * No implants in log *    Specimens:   Specimen (24h ago, onward)       Start     Ordered    08/21/24 1512  Specimen to Pathology  RELEASE UPON ORDERING         References:    Click here for ordering Quick Tip   Question:  Release to patient  Answer:  Immediate    08/21/24 9082                            Condition: Stable    Disposition: PACU - hemodynamically stable.    Gilberto Pepper MD  [employer] General Surgery PGY-1

## 2024-08-23 VITALS
BODY MASS INDEX: 26.64 KG/M2 | TEMPERATURE: 98 F | SYSTOLIC BLOOD PRESSURE: 130 MMHG | OXYGEN SATURATION: 100 % | RESPIRATION RATE: 18 BRPM | HEART RATE: 60 BPM | WEIGHT: 184 LBS | DIASTOLIC BLOOD PRESSURE: 91 MMHG

## 2024-08-28 LAB
ESTROGEN SERPL-MCNC: NORMAL PG/ML
INSULIN SERPL-ACNC: NORMAL U[IU]/ML
LAB AP CLINICAL INFORMATION: NORMAL
LAB AP GROSS DESCRIPTION: NORMAL
LAB AP REPORT FOOTNOTES: NORMAL
T3RU NFR SERPL: NORMAL %

## 2024-09-05 ENCOUNTER — OFFICE VISIT (OUTPATIENT)
Dept: SURGERY | Facility: CLINIC | Age: 39
End: 2024-09-05
Payer: MEDICAID

## 2024-09-05 VITALS
BODY MASS INDEX: 27.55 KG/M2 | OXYGEN SATURATION: 100 % | DIASTOLIC BLOOD PRESSURE: 71 MMHG | HEART RATE: 59 BPM | RESPIRATION RATE: 19 BRPM | SYSTOLIC BLOOD PRESSURE: 119 MMHG | HEIGHT: 69 IN | TEMPERATURE: 98 F | WEIGHT: 186 LBS

## 2024-09-05 DIAGNOSIS — Z98.890 S/P EXCISION OF LIPOMA: ICD-10-CM

## 2024-09-05 DIAGNOSIS — D17.23 LIPOMA OF RIGHT LOWER EXTREMITY: Primary | ICD-10-CM

## 2024-09-05 DIAGNOSIS — Z86.018 S/P EXCISION OF LIPOMA: ICD-10-CM

## 2024-09-05 PROCEDURE — 99214 OFFICE O/P EST MOD 30 MIN: CPT | Mod: PBBFAC

## 2024-09-05 NOTE — PROGRESS NOTES
[facility]   Clinic Note       CC: 2 week post-op for right hip lipoma       HPI: Tonio Tello is a 38 y.o. male with no pertinent PMHx presents for 2 week wound check following removal of a 11.5 x 10 x 2.5 cm lipoma on the lateral aspect of the Right hip 8/21/24.   Denies any pain, fever, chills, redness, or drainage from the wound.         PMH:  No past medical history on file.      PSH:  Past Surgical History:   Procedure Laterality Date    HEMORRHOID SURGERY      x 2 surgeries 3 years ago    LIPOMA RESECTION Right 8/21/2024    Procedure: EXCISION, LIPOMA;  Surgeon: Elliot Spann MD;  Location: [facility];  Service: General;  Laterality: Right;  R Hip, needs to be lateral         Medications:  Current Outpatient Medications on File Prior to Visit   Medication Sig Dispense Refill    HYDROcodone-acetaminophen (NORCO) 5-325 mg per tablet Take 1 tablet by mouth every 8 (eight) hours as needed for Pain. 5 tablet 0     No current facility-administered medications on file prior to visit.        Allergies:  Review of patient's allergies indicates:   Allergen Reactions    Pcn [penicillins]          Social Hx:  Social History     Tobacco Use   Smoking Status Every Day    Current packs/day: 1.00    Average packs/day: 1 pack/day for 5.0 years (5.0 ttl pk-yrs)    Types: Cigarettes   Smokeless Tobacco Never   Tobacco Comments    Smokes 1 pack per week      Social History     Substance and Sexual Activity   Alcohol Use Yes    Comment: 6 times yearly--1 beer         Relevant Family Hx:  Family History   Problem Relation Name Age of Onset    Cancer Mother      Heart disease Father            Physical Exam:   There were no vitals filed for this visit.     Gen: NAD, AAOx3   Eye: EOMI   CV: RR  Pulm: NWOB on RA  Abd: soft, non-tender, non-distended  Ext:  Move all 4 extremities.  Right hip incision intact, clean, and nonerythematous.  Appropriately mild tenderness.           Path:  Gross Description    1. Lipoma:  right hip soft tissue subcutaneous mass  Received in formalin is a lobulated nodular fragment of yellow fatty tissue measuring 11.5 x 10 x 2.5 cm.  Cut section reveals homogeneous adipose tissue surrounded by a thin transparent membrane.  Representative sections submitted in cassettes A through C   Final Diagnosis      Right hip soft tissue subcutaneous mass, excision:   - Lipoma.        A/P: Tonio Tello is a 38 y.o. male presenting for 2 week wound check of R hip lipoma.  Incision healing well.      - Follow up PRN    Elliot Perez  [employer] General Surgery, MS3

## 2024-09-05 NOTE — PROGRESS NOTES
[facility]   Clinic Note       CC: 2 week post-op for right hip lipoma       HPI: Tonio Tello is a 38 y.o. male with no pertinent PMHx presents for 2 week wound check following removal of a 11.5 x 10 x 2.5 cm lipoma on the lateral aspect of the Right hip 8/21/24.   Denies any pain, fever, chills, redness, or drainage from the wound. Wound is healing well. He is performing his normal day-to-day activities.         PMH:  History reviewed. No pertinent past medical history.      PSH:  Past Surgical History:   Procedure Laterality Date    HEMORRHOID SURGERY      x 2 surgeries 3 years ago    LIPOMA RESECTION Right 8/21/2024    Procedure: EXCISION, LIPOMA;  Surgeon: Elliot Spann MD;  Location: [facility];  Service: General;  Laterality: Right;  R Hip, needs to be lateral         Medications:  Current Outpatient Medications on File Prior to Visit   Medication Sig Dispense Refill    HYDROcodone-acetaminophen (NORCO) 5-325 mg per tablet Take 1 tablet by mouth every 8 (eight) hours as needed for Pain. (Patient not taking: Reported on 9/5/2024) 5 tablet 0     No current facility-administered medications on file prior to visit.        Allergies:  Review of patient's allergies indicates:   Allergen Reactions    Pcn [penicillins]          Social Hx:  Social History     Tobacco Use   Smoking Status Every Day    Current packs/day: 1.00    Average packs/day: 1 pack/day for 5.0 years (5.0 ttl pk-yrs)    Types: Cigarettes   Smokeless Tobacco Never   Tobacco Comments    Smokes 1 pack per week      Social History     Substance and Sexual Activity   Alcohol Use Yes    Comment: 6 times yearly--1 beer         Relevant Family Hx:  Family History   Problem Relation Name Age of Onset    Cancer Mother      Heart disease Father            Physical Exam:   Vitals:    09/05/24 1111   BP: 119/71   Pulse: (!) 59   Resp: 19   Temp: 97.7 °F (36.5 °C)        Gen: NAD, AAOx3   Eye: EOMI   CV: RR  Pulm: NWOB on RA  Abd: soft,  non-tender, non-distended  Ext:  Move all 4 extremities.  Right hip incision intact, clean, and nonerythematous.  Appropriately mild tenderness.           Path:  Gross Description    1. Lipoma: right hip soft tissue subcutaneous mass  Received in formalin is a lobulated nodular fragment of yellow fatty tissue measuring 11.5 x 10 x 2.5 cm.  Cut section reveals homogeneous adipose tissue surrounded by a thin transparent membrane.  Representative sections submitted in cassettes A through C   Final Diagnosis      Right hip soft tissue subcutaneous mass, excision:   - Lipoma.        A/P: Tonio Tello is a 38 y.o. male presenting for 2 week wound check of R hip lipoma.  Incision healing well.    - Okay to remove dermabond   - Call clinic if any erythema, drainage, fevers, chills  - Follow up PRN    Elliot Perez  [facility] General Surgery, MS3    Attestation   I have seen and examined the patient with the medical student above. The above note was edited as necessary. I agree with above assessment and plan.       Jacquie Rodriguez MD  [facility] General Surgery, PGY-3

## 2024-09-05 NOTE — PROGRESS NOTES
I have reviewed the notes, assessments, and/or procedures performed by Michael, I concur with her/his documentation of Tonio Tello.  Date of Service: 9/5/2024    [facility]

## (undated) DEVICE — GLOVE SENSICARE PI GRN 6.5

## (undated) DEVICE — GLOVE SENSICARE NEOPRENE 6.5

## (undated) DEVICE — SUT MCRYL PLUS 4-0 PS2 27IN

## (undated) DEVICE — NDL HYPO REG 25G X 1 1/2

## (undated) DEVICE — Device

## (undated) DEVICE — MANIFOLD 4 PORT

## (undated) DEVICE — GLOVE SIGNATURE MICRO LTX 7

## (undated) DEVICE — ADHESIVE DERMABOND ADVANCED

## (undated) DEVICE — GLOVE SIGNATURE MICRO LTX 7.5

## (undated) DEVICE — APPLICATOR CHLORAPREP ORN 26ML

## (undated) DEVICE — TUBING MEDI-VAC 20FT .25IN

## (undated) DEVICE — ELECTRODE PATIENT RETURN DISP

## (undated) DEVICE — KIT SURGICAL TURNOVER

## (undated) DEVICE — SUT VICRYL 3-0 27 SH

## (undated) DEVICE — SYR 10CC LUER LOCK

## (undated) DEVICE — CONNECTOR ARGYLE 5-IN-1 SUC

## (undated) DEVICE — GLOVE SIGNATURE MICRO LTX 6.5

## (undated) DEVICE — CLIPPER BLADE MOD 4406 (CAREF)

## (undated) DEVICE — GOWN POLY REINF BRTH SLV XL

## (undated) DEVICE — SOL NACL IRR 1000ML BTL